# Patient Record
Sex: MALE | Race: ASIAN | NOT HISPANIC OR LATINO
[De-identification: names, ages, dates, MRNs, and addresses within clinical notes are randomized per-mention and may not be internally consistent; named-entity substitution may affect disease eponyms.]

---

## 2018-09-07 ENCOUNTER — APPOINTMENT (OUTPATIENT)
Dept: UROLOGY | Facility: CLINIC | Age: 73
End: 2018-09-07
Payer: MEDICARE

## 2018-09-07 VITALS
RESPIRATION RATE: 17 BRPM | TEMPERATURE: 98.4 F | WEIGHT: 153 LBS | HEIGHT: 64 IN | BODY MASS INDEX: 26.12 KG/M2 | OXYGEN SATURATION: 100 % | SYSTOLIC BLOOD PRESSURE: 151 MMHG | DIASTOLIC BLOOD PRESSURE: 88 MMHG | HEART RATE: 66 BPM

## 2018-09-07 PROCEDURE — 99204 OFFICE O/P NEW MOD 45 MIN: CPT

## 2018-09-07 RX ORDER — FINASTERIDE 5 MG/1
5 TABLET, FILM COATED ORAL
Refills: 0 | Status: ACTIVE | COMMUNITY

## 2018-09-07 RX ORDER — TAMSULOSIN HYDROCHLORIDE 0.4 MG/1
0.4 CAPSULE ORAL
Refills: 0 | Status: ACTIVE | COMMUNITY

## 2018-09-08 LAB
ANION GAP SERPL CALC-SCNC: 13 MMOL/L
APPEARANCE: CLEAR
BACTERIA: NEGATIVE
BILIRUBIN URINE: NEGATIVE
BLOOD URINE: NEGATIVE
BUN SERPL-MCNC: 13 MG/DL
CALCIUM SERPL-MCNC: 8.9 MG/DL
CHLORIDE SERPL-SCNC: 103 MMOL/L
CO2 SERPL-SCNC: 24 MMOL/L
COLOR: YELLOW
CREAT SERPL-MCNC: 1.1 MG/DL
GLUCOSE QUALITATIVE U: NEGATIVE MG/DL
GLUCOSE SERPL-MCNC: 93 MG/DL
KETONES URINE: NEGATIVE
LEUKOCYTE ESTERASE URINE: NEGATIVE
MICROSCOPIC-UA: NORMAL
NITRITE URINE: NEGATIVE
PH URINE: 6.5
POTASSIUM SERPL-SCNC: 4.5 MMOL/L
PROTEIN URINE: NEGATIVE MG/DL
PSA FREE FLD-MCNC: 24.7
PSA FREE SERPL-MCNC: 0.91 NG/ML
PSA SERPL-MCNC: 3.68 NG/ML
RED BLOOD CELLS URINE: 0 /HPF
SODIUM SERPL-SCNC: 140 MMOL/L
SPECIFIC GRAVITY URINE: 1.01
SQUAMOUS EPITHELIAL CELLS: 0 /HPF
UROBILINOGEN URINE: NEGATIVE MG/DL
WHITE BLOOD CELLS URINE: 0 /HPF

## 2018-12-04 ENCOUNTER — APPOINTMENT (OUTPATIENT)
Dept: UROLOGY | Facility: CLINIC | Age: 73
End: 2018-12-04
Payer: MEDICARE

## 2018-12-04 VITALS
RESPIRATION RATE: 17 BRPM | TEMPERATURE: 98.2 F | HEART RATE: 76 BPM | OXYGEN SATURATION: 97 % | SYSTOLIC BLOOD PRESSURE: 130 MMHG | BODY MASS INDEX: 27.46 KG/M2 | DIASTOLIC BLOOD PRESSURE: 80 MMHG | WEIGHT: 160 LBS

## 2018-12-04 PROCEDURE — 99214 OFFICE O/P EST MOD 30 MIN: CPT

## 2019-12-03 ENCOUNTER — APPOINTMENT (OUTPATIENT)
Dept: UROLOGY | Facility: CLINIC | Age: 74
End: 2019-12-03

## 2019-12-06 ENCOUNTER — APPOINTMENT (OUTPATIENT)
Dept: UROLOGY | Facility: CLINIC | Age: 74
End: 2019-12-06
Payer: MEDICARE

## 2019-12-06 VITALS
BODY MASS INDEX: 27.12 KG/M2 | HEART RATE: 77 BPM | WEIGHT: 158 LBS | TEMPERATURE: 98.1 F | DIASTOLIC BLOOD PRESSURE: 80 MMHG | SYSTOLIC BLOOD PRESSURE: 134 MMHG | RESPIRATION RATE: 17 BRPM | OXYGEN SATURATION: 97 %

## 2019-12-06 PROCEDURE — 99214 OFFICE O/P EST MOD 30 MIN: CPT | Mod: 25

## 2019-12-06 PROCEDURE — 51798 US URINE CAPACITY MEASURE: CPT

## 2019-12-06 NOTE — LETTER BODY
[FreeTextEntry1] : Rojelio Kenny MD\par [address]\[place] [address]\par Phone: [phone]\par Fax: [phone]\par \par Dear Dr. Kenny,\par \par REASON FOR VISIT: Elevated PSA. BPH\par \par This is a 74 year-old gentleman with history of elevated PSA, presenting with lower urinary tract symptoms and BPH. Patient returns for follow-up. Since his last visit, he is currently taking Flomax BID without side effects or difficulties. Patient reports some improvement but continues to complain of nocturia 2-4x a night. He denies any hematuria or urinary incontinence. His symptoms are aggravated by hydration. He denies any alleviating factors.  He denies any pain. All other review of systems are negative. He has no cancer in his family medical history. He has history of partial colectomy for colon cancer. Past medical history, family history and social history were unchanged. Medications and allergies were reviewed. He has no known allergies to medication. \par \par On examination, the patient is an older-appearing gentleman in no acute distress. He is alert and oriented follows commands. He has normal mood and affect. He is normocephalic. Neck is supple. Oral no thrush. Respirations are unlabored. His abdomen is soft and nontender. Bladder is nonpalpable. No CVA tenderness. Neurologically he is grossly intact. No peripheral edema. Skin without gross abnormality. He has normal male external genitalia. Normal meatus. Bilateral testes are descended intrascrotally and normal to palpation. On rectal examination, there is normal sphincter tone. The prostate is clinically benign without focal induration or nodularity.\par \par His BMP demonstrated normal renal functions, creatinine 1.1. His PSA was 3.68, which is within normal limits. \par \par Post-void residual on bladder scan today was 130 cc. \par \par ASSESSMENT: Elevated PSA. BPH\par \par I counseled the patient today. In terms of his BPH, I renewed his prescription for Flomax BID and Proscar today. I advised the patient to restrict PM fluid intake to avoid aggravating his symptoms. The patient will follow up in 3 months. Patient will obtain BMP and PSA testing to ensure stability. Risks and alternatives were discussed. I answered the patient questions. The patient will follow-up as directed and will contact me with any questions or concerns. Thank you for the opportunity to participate in the care of Mr. BUNDY. I will keep you updated on his progress.\par \par Plan: Continue Proscar and Flomax to BID. BMP. PSA. Follow up in 6 months.

## 2019-12-06 NOTE — ADDENDUM
[FreeTextEntry1] : Entered by Bonilla Bustillo, acting as scribe for Dr. Jarret Park.\par \par The documentation recorded by the scribe accurately reflects the service I personally performed and the decisions made by me.

## 2019-12-11 LAB
ANION GAP SERPL CALC-SCNC: 14 MMOL/L
BUN SERPL-MCNC: 14 MG/DL
CALCIUM SERPL-MCNC: 9.7 MG/DL
CHLORIDE SERPL-SCNC: 95 MMOL/L
CO2 SERPL-SCNC: 25 MMOL/L
CREAT SERPL-MCNC: 1.15 MG/DL
GLUCOSE SERPL-MCNC: 102 MG/DL
POTASSIUM SERPL-SCNC: 5.3 MMOL/L
PSA FREE FLD-MCNC: 24 %
PSA FREE SERPL-MCNC: 1.06 NG/ML
PSA SERPL-MCNC: 4.45 NG/ML
SODIUM SERPL-SCNC: 134 MMOL/L

## 2020-06-05 ENCOUNTER — APPOINTMENT (OUTPATIENT)
Dept: UROLOGY | Facility: CLINIC | Age: 75
End: 2020-06-05
Payer: MEDICARE

## 2020-06-05 VITALS
DIASTOLIC BLOOD PRESSURE: 84 MMHG | OXYGEN SATURATION: 97 % | TEMPERATURE: 97.5 F | HEART RATE: 80 BPM | RESPIRATION RATE: 17 BRPM | SYSTOLIC BLOOD PRESSURE: 146 MMHG | WEIGHT: 159 LBS | BODY MASS INDEX: 27.29 KG/M2

## 2020-06-05 LAB
APPEARANCE: CLEAR
BACTERIA: NEGATIVE
BILIRUBIN URINE: NEGATIVE
BLOOD URINE: NEGATIVE
COLOR: COLORLESS
GLUCOSE QUALITATIVE U: NEGATIVE
HYALINE CASTS: 0 /LPF
KETONES URINE: NEGATIVE
LEUKOCYTE ESTERASE URINE: NEGATIVE
MICROSCOPIC-UA: NORMAL
NITRITE URINE: NEGATIVE
PH URINE: 6.5
PROTEIN URINE: NEGATIVE
RED BLOOD CELLS URINE: 0 /HPF
SPECIFIC GRAVITY URINE: 1.01
SQUAMOUS EPITHELIAL CELLS: 0 /HPF
UROBILINOGEN URINE: NORMAL
WHITE BLOOD CELLS URINE: 0 /HPF

## 2020-06-05 PROCEDURE — 99214 OFFICE O/P EST MOD 30 MIN: CPT

## 2020-06-05 NOTE — HISTORY OF PRESENT ILLNESS
[FreeTextEntry1] : FUA BPH\par FLOMAX/PRSOCAR\par PVR 25 CC\par NEG ANIA\par \par FUA 1 YEAR\par \par Please refer to URO Consult note

## 2020-06-05 NOTE — ADDENDUM
[FreeTextEntry1] : Entered by Jacob Regalado, acting as scribe for Dr. Jarret Park.\par \par The documentation recorded by the scribe accurately reflects the service I personally performed and the decisions made by me.

## 2020-06-05 NOTE — LETTER BODY
[FreeTextEntry1] : Rojelio Kenny MD\par [address]\[place] [address]\par Phone: [phone]\par Fax: [phone]\par \par Dear Dr. Kenny,\par \par REASON FOR VISIT: Elevated PSA. BPH\par \par This is a 74 year-old gentleman with history of elevated PSA, presenting with BPH. Patient returns for follow up. Since his last visit, the patient reports taking Flomax BID and Proscar without any side effects or difficulties. Patient reports of an improvement in his urinary symptoms with medication. He denies any hematuria, dysuria, or urinary incontinence. He denies any changes in health. He has no pain. The past medical history, family history and social history are unchanged. All other review of systems are negative. Patient denies any changes in medications. Medication list was reconciled. \par \par On examination, the patient is an older-appearing gentleman in no acute distress. He is alert and oriented follows commands. He has normal mood and affect. He is normocephalic. Neck is supple. Oral no thrush. Respirations are unlabored. His abdomen is soft and nontender. Bladder is nonpalpable. No CVA tenderness. Neurologically he is grossly intact. No peripheral edema. Skin without gross abnormality. He has normal male external genitalia. Normal meatus. Bilateral testes are descended intrascrotally and normal to palpation. On rectal examination, there is normal sphincter tone. The prostate is clinically benign without focal induration or nodularity.\par \par His BMP in December 2019 demonstrated normal renal functions, creatinine 1.15. His PSA was 4.45, which is elevated. \par \par Post-void residual on bladder scan today was 25 cc. \par \par ASSESSMENT: Elevated PSA. BPH, symptoms improved with Flomax BID and Proscar.\par \par I counseled the patient today. He is doing well. In terms of his BPH, his symptoms are improved with medical therapy. I renewed the patient's prescription for Flomax BID and Proscar today. I encouraged the patient to continue medications regularly as directed. He will obtain urinalysis today to ensure stability. Risks and alternatives were discussed. I answered the patient questions. The patient will follow-up as directed and will contact me with any questions or concerns. Thank you for the opportunity to participate in the care of Mr. BUNDY. I will keep you updated on his progress.\par \par Plan: Continue Flomax BID and Proscar. Urinalysis. Follow up in 1 year.\par \par I spent over half of the 25-minute encounter counseling the patient and coordinating his care.

## 2021-06-04 ENCOUNTER — APPOINTMENT (OUTPATIENT)
Dept: UROLOGY | Facility: CLINIC | Age: 76
End: 2021-06-04
Payer: MEDICARE

## 2021-06-04 VITALS
SYSTOLIC BLOOD PRESSURE: 123 MMHG | DIASTOLIC BLOOD PRESSURE: 82 MMHG | OXYGEN SATURATION: 97 % | TEMPERATURE: 96.6 F | BODY MASS INDEX: 26.61 KG/M2 | HEART RATE: 97 BPM | RESPIRATION RATE: 18 BRPM | WEIGHT: 155 LBS

## 2021-06-04 LAB
ANION GAP SERPL CALC-SCNC: 10 MMOL/L
BUN SERPL-MCNC: 13 MG/DL
CALCIUM SERPL-MCNC: 9.1 MG/DL
CHLORIDE SERPL-SCNC: 102 MMOL/L
CO2 SERPL-SCNC: 26 MMOL/L
CREAT SERPL-MCNC: 1.33 MG/DL
GLUCOSE SERPL-MCNC: 93 MG/DL
POTASSIUM SERPL-SCNC: 4.4 MMOL/L
PSA FREE FLD-MCNC: 22 %
PSA FREE SERPL-MCNC: 1.03 NG/ML
PSA SERPL-MCNC: 4.66 NG/ML
SODIUM SERPL-SCNC: 138 MMOL/L

## 2021-06-04 PROCEDURE — 99214 OFFICE O/P EST MOD 30 MIN: CPT

## 2021-06-04 PROCEDURE — 99072 ADDL SUPL MATRL&STAF TM PHE: CPT

## 2021-06-06 NOTE — LETTER BODY
[FreeTextEntry1] : \par Rojelio Kenny MD\par [address]\[place] [address]\par Phone: [phone]\par Fax: [phone]\par \par Dear Dr. Kenny,\par \par REASON FOR VISIT: Elevated PSA. BPH\par \par This is a 74 year-old gentleman with history of elevated PSA, presenting with BPH. Patient returns for follow up. His BMP in December 2019 demonstrated normal renal functions, creatinine 1.15. His PSA was 4.45, which is elevated. Since his last visit, the patient reports taking Flomax BID and Proscar without any side effects or difficulties. Patient reports of an improvement in his urinary symptoms with medication. He denies any hematuria, dysuria, or urinary incontinence. He denies any changes in health. He has no pain. The past medical history, family history and social history are unchanged. All other review of systems are negative. Patient denies any changes in medications. Medication list was reconciled. \par \par On examination, the patient is an older-appearing gentleman in no acute distress. He is alert and oriented follows commands. He has normal mood and affect. He is normocephalic. Neck is supple. Oral no thrush. Respirations are unlabored. His abdomen is soft and nontender. Bladder is nonpalpable. No CVA tenderness. Neurologically he is grossly intact. No peripheral edema. Skin without gross abnormality. He has normal male external genitalia. Normal meatus. Bilateral testes are descended intrascrotally and normal to palpation. On rectal examination, there is normal sphincter tone. The prostate is clinically benign without focal induration or nodularity.\par \par His previous BMP in December 2019 demonstrated normal renal functions, creatinine 1.15. His PSA was 4.45, which is elevated. \par \par ASSESSMENT: Elevated PSA. BPH, symptoms improved with Flomax BID and Proscar.\par \par I counseled the patient today. He is doing well. In terms of his BPH, his symptoms are improved with medical therapy. I renewed the patient's prescription for Flomax BID and Proscar today. I encouraged the patient to continue medications regularly as directed. He will obtain urinalysis today to ensure stability.  In terms of his PSA, patient will repeat his PSA to ensure stability.  Risks and alternatives were discussed. I answered the patient questions. The patient will follow-up as directed and will contact me with any questions or concerns. Thank you for the opportunity to participate in the care of Mr. BUNDY. I will keep you updated on his progress.\par \par Plan: Continue Flomax BID and Proscar. BMP. PSA. Urinalysis. Follow up in 1 year.

## 2021-06-06 NOTE — ADDENDUM
[FreeTextEntry1] : Entered by Noemi Curiel, acting as scribe for Dr. Jarret Park.\par \par The documentation recorded by the scribe accurately reflects the service I personally performed and the decisions made by me.

## 2022-06-10 ENCOUNTER — APPOINTMENT (OUTPATIENT)
Dept: UROLOGY | Facility: CLINIC | Age: 77
End: 2022-06-10

## 2022-10-21 ENCOUNTER — APPOINTMENT (OUTPATIENT)
Dept: UROLOGY | Facility: CLINIC | Age: 77
End: 2022-10-21

## 2022-10-21 VITALS
DIASTOLIC BLOOD PRESSURE: 80 MMHG | HEART RATE: 93 BPM | OXYGEN SATURATION: 99 % | TEMPERATURE: 97 F | RESPIRATION RATE: 18 BRPM | WEIGHT: 160 LBS | SYSTOLIC BLOOD PRESSURE: 120 MMHG | BODY MASS INDEX: 27.46 KG/M2

## 2022-10-21 DIAGNOSIS — G25.0 ESSENTIAL TREMOR: ICD-10-CM

## 2022-10-21 DIAGNOSIS — Z00.00 ENCOUNTER FOR GENERAL ADULT MEDICAL EXAMINATION W/OUT ABNORMAL FINDINGS: ICD-10-CM

## 2022-10-21 PROCEDURE — 99214 OFFICE O/P EST MOD 30 MIN: CPT

## 2022-10-21 NOTE — LETTER BODY
[FreeTextEntry1] : Rojelio Kenny MD\par [address]\[place] [address]\par Phone: [phone]\par Fax: [phone]\par \par Dear Dr. Kenny,\par \par REASON FOR VISIT: Elevated PSA. BPH\par \par This is a 76 year-old gentleman with history of elevated PSA, presenting with BPH. Patient returns for follow up. Since his last visit, the patient reports taking Flomax BID and Proscar without any side effects or difficulties. Patient reports of improvement in his urinary symptoms with medication. He denies any hematuria, dysuria, or urinary incontinence. He denies any changes in health. He has no pain. The past medical history, family history and social history are unchanged. All other review of systems are negative. Patient denies any changes in medications. Medication list was reconciled. \par \par On examination, the patient is an older-appearing gentleman in no acute distress. He is alert and oriented follows commands. He has normal mood and affect. He is normocephalic. Neck is supple. Oral no thrush. Respirations are unlabored. His abdomen is soft and nontender. Bladder is nonpalpable. No CVA tenderness. Neurologically he is grossly intact. No peripheral edema. Skin without gross abnormality. He has normal male external genitalia. Normal meatus. Bilateral testes are descended intrascrotally and normal to palpation. On rectal examination, there is normal sphincter tone. The prostate is clinically benign without focal induration or nodularity.\par \par His BMP in June 2021 demonstrated decreased renal functions, creatinine 1.33. His PSA was 4.66, which is elevated. \par \par ASSESSMENT: Elevated PSA. BPH, symptoms improved with Flomax BID and Proscar.\par \par I counseled the patient today. He is doing well. In terms of his BPH, his symptoms are improved with medical therapy. I renewed the patient's prescription for Flomax BID and Proscar today. I encouraged the patient to continue medications regularly as directed. He will obtain PSA and BMP today to ensure stability. Risks and alternatives were discussed. I answered the patient questions. The patient will follow-up as directed and will contact me with any questions or concerns. Thank you for the opportunity to participate in the care of Mr. DU. I will keep you updated on his progress.\par \par Plan: Continue Flomax BID and Proscar. BMP. PSA. Follow up in 1 year.\par \par His BMP today demonstrated decreased renal function, creatinine 1.37. His PSA today was 4.11, which is elevated but improved.

## 2022-10-21 NOTE — ADDENDUM
[FreeTextEntry1] : Entered by SANTOS MANNING, acting as scribe for Dr. Jarret Park.\par The documentation recorded by the scribe accurately reflects the service I personally performed and the decisions made by me.

## 2022-10-22 LAB
ANION GAP SERPL CALC-SCNC: 17 MMOL/L
BUN SERPL-MCNC: 17 MG/DL
CALCIUM SERPL-MCNC: 9 MG/DL
CHLORIDE SERPL-SCNC: 99 MMOL/L
CO2 SERPL-SCNC: 21 MMOL/L
CREAT SERPL-MCNC: 1.37 MG/DL
EGFR: 53 ML/MIN/1.73M2
GLUCOSE SERPL-MCNC: 152 MG/DL
POTASSIUM SERPL-SCNC: 4.5 MMOL/L
PSA FREE FLD-MCNC: 30 %
PSA FREE SERPL-MCNC: 1.23 NG/ML
PSA SERPL-MCNC: 4.11 NG/ML
SODIUM SERPL-SCNC: 137 MMOL/L

## 2023-10-20 ENCOUNTER — APPOINTMENT (OUTPATIENT)
Dept: UROLOGY | Facility: CLINIC | Age: 78
End: 2023-10-20

## 2024-03-15 ENCOUNTER — APPOINTMENT (OUTPATIENT)
Dept: UROLOGY | Facility: CLINIC | Age: 79
End: 2024-03-15
Payer: MEDICARE

## 2024-03-15 VITALS
BODY MASS INDEX: 28.32 KG/M2 | WEIGHT: 165 LBS | DIASTOLIC BLOOD PRESSURE: 86 MMHG | OXYGEN SATURATION: 96 % | SYSTOLIC BLOOD PRESSURE: 133 MMHG | TEMPERATURE: 96.8 F | HEART RATE: 88 BPM | RESPIRATION RATE: 16 BRPM

## 2024-03-15 DIAGNOSIS — N40.1 BENIGN PROSTATIC HYPERPLASIA WITH LOWER URINARY TRACT SYMPMS: ICD-10-CM

## 2024-03-15 DIAGNOSIS — N13.8 BENIGN PROSTATIC HYPERPLASIA WITH LOWER URINARY TRACT SYMPMS: ICD-10-CM

## 2024-03-15 DIAGNOSIS — R97.20 ELEVATED PROSTATE, SPECIFIC ANTIGEN [PSA]: ICD-10-CM

## 2024-03-15 PROCEDURE — 99214 OFFICE O/P EST MOD 30 MIN: CPT

## 2024-03-15 PROCEDURE — G2211 COMPLEX E/M VISIT ADD ON: CPT

## 2024-03-16 LAB
ANION GAP SERPL CALC-SCNC: 15 MMOL/L
APPEARANCE: CLEAR
BACTERIA: NEGATIVE /HPF
BILIRUBIN URINE: NEGATIVE
BLOOD URINE: NEGATIVE
BUN SERPL-MCNC: 17 MG/DL
CALCIUM SERPL-MCNC: 9.6 MG/DL
CAST: 0 /LPF
CHLORIDE SERPL-SCNC: 102 MMOL/L
CO2 SERPL-SCNC: 24 MMOL/L
COLOR: YELLOW
CREAT SERPL-MCNC: 1.38 MG/DL
EGFR: 52 ML/MIN/1.73M2
EPITHELIAL CELLS: 0 /HPF
GLUCOSE QUALITATIVE U: NEGATIVE MG/DL
GLUCOSE SERPL-MCNC: 125 MG/DL
KETONES URINE: NEGATIVE MG/DL
LEUKOCYTE ESTERASE URINE: NEGATIVE
MICROSCOPIC-UA: NORMAL
NITRITE URINE: NEGATIVE
PH URINE: 7
POTASSIUM SERPL-SCNC: 4.9 MMOL/L
PROTEIN URINE: NEGATIVE MG/DL
PSA FREE FLD-MCNC: 28 %
PSA FREE SERPL-MCNC: 1.05 NG/ML
PSA SERPL-MCNC: 3.74 NG/ML
RED BLOOD CELLS URINE: 0 /HPF
SODIUM SERPL-SCNC: 140 MMOL/L
SPECIFIC GRAVITY URINE: 1.01
UROBILINOGEN URINE: 0.2 MG/DL
WHITE BLOOD CELLS URINE: 0 /HPF

## 2024-03-16 NOTE — ADDENDUM
[FreeTextEntry1] : Entered by Musa Jorge, acting as scribe for Dr. Jarret Park. The documentation recorded by the scribe accurately reflects the service I personally performed and the decisions made by me.

## 2024-03-16 NOTE — LETTER BODY
[FreeTextEntry1] : Rojelio Kenny MD  [address] [place] [id number]  Phone: [phone]  Fax: [phone]    Dear Dr. Kenny,    REASON FOR VISIT: Elevated PSA. BPH    This is a 78 year-old gentleman with history of elevated PSA, presenting with BPH. Patient returns for follow up. Since his last visit, the patient reports taking Flomax BID and Proscar without any side effects or difficulties. Patient reports of stable urinary symptoms with medication. He denies any hematuria, dysuria, or urinary incontinence. He denies any changes in health. He has no pain. The past medical history, family history and social history are unchanged. All other review of systems are negative. Patient denies any changes in medications. Medication list was reconciled.    On examination, the patient is an older-appearing gentleman in no acute distress. He is alert and oriented follows commands. He has normal mood and affect. He is normocephalic. Neck is supple. Oral no thrush. Respirations are unlabored. His abdomen is soft and nontender. Bladder is nonpalpable. No CVA tenderness. Neurologically he is grossly intact. No peripheral edema. Skin without gross abnormality. He has normal male external genitalia. Normal meatus. Bilateral testes are descended intrascrotally and normal to palpation. On rectal examination, there is normal sphincter tone. The prostate is clinically benign without focal induration or nodularity.    His BMP in 2022 demonstrated decreased renal function, creatinine 1.37. His PSA was 4.11, which is elevated but improved.    ASSESSMENT: Elevated PSA. BPH, symptoms improved with Flomax BID and Proscar.    I counseled the patient today. He is doing well. In terms of his BPH, his symptoms are stable with medical therapy. I renewed the patient's prescription for Flomax BID and Proscar today. I encouraged the patient to continue medications regularly as directed. He will obtain PSA and BMP today to ensure stability. Risks and alternatives were discussed. I answered the patient questions. The patient will follow-up as directed and will contact me with any questions or concerns. Thank you for the opportunity to participate in the care of Mr. BUNDY. I will keep you updated on his progress.  Patient will continue longitudinal care for his complex and serious chronic condition.   Plan: Continue Flomax BID and Proscar. BMP. PSA. Follow up in 1 year.  I spent 30-minutes time today on all issues related to this patient on today date of service including non face to face time.

## 2024-03-18 LAB — URINE CYTOLOGY: NORMAL

## 2025-03-14 ENCOUNTER — APPOINTMENT (OUTPATIENT)
Dept: UROLOGY | Facility: CLINIC | Age: 80
End: 2025-03-14
Payer: MEDICARE

## 2025-03-14 VITALS
DIASTOLIC BLOOD PRESSURE: 83 MMHG | SYSTOLIC BLOOD PRESSURE: 133 MMHG | BODY MASS INDEX: 28.49 KG/M2 | RESPIRATION RATE: 16 BRPM | HEART RATE: 89 BPM | TEMPERATURE: 97.2 F | OXYGEN SATURATION: 99 % | WEIGHT: 166 LBS

## 2025-03-14 DIAGNOSIS — N40.1 BENIGN PROSTATIC HYPERPLASIA WITH LOWER URINARY TRACT SYMPMS: ICD-10-CM

## 2025-03-14 DIAGNOSIS — R97.20 ELEVATED PROSTATE, SPECIFIC ANTIGEN [PSA]: ICD-10-CM

## 2025-03-14 DIAGNOSIS — N13.8 BENIGN PROSTATIC HYPERPLASIA WITH LOWER URINARY TRACT SYMPMS: ICD-10-CM

## 2025-03-14 PROCEDURE — 51798 US URINE CAPACITY MEASURE: CPT

## 2025-03-14 PROCEDURE — 99214 OFFICE O/P EST MOD 30 MIN: CPT

## 2025-03-14 PROCEDURE — G2211 COMPLEX E/M VISIT ADD ON: CPT

## 2025-03-16 LAB
ANION GAP SERPL CALC-SCNC: 15 MMOL/L
APPEARANCE: CLEAR
BACTERIA UR CULT: NORMAL
BACTERIA: NEGATIVE /HPF
BILIRUBIN URINE: NEGATIVE
BLOOD URINE: NEGATIVE
BUN SERPL-MCNC: 15 MG/DL
CALCIUM SERPL-MCNC: 9 MG/DL
CAST: 0 /LPF
CHLORIDE SERPL-SCNC: 100 MMOL/L
CO2 SERPL-SCNC: 23 MMOL/L
COLOR: YELLOW
CREAT SERPL-MCNC: 1.32 MG/DL
EGFRCR SERPLBLD CKD-EPI 2021: 55 ML/MIN/1.73M2
EPITHELIAL CELLS: 0 /HPF
GLUCOSE QUALITATIVE U: NEGATIVE MG/DL
GLUCOSE SERPL-MCNC: 85 MG/DL
KETONES URINE: NEGATIVE MG/DL
LEUKOCYTE ESTERASE URINE: NEGATIVE
MICROSCOPIC-UA: NORMAL
NITRITE URINE: NEGATIVE
PH URINE: 7
POTASSIUM SERPL-SCNC: 4.3 MMOL/L
PROTEIN URINE: NEGATIVE MG/DL
PSA FREE FLD-MCNC: 26 %
PSA FREE SERPL-MCNC: 0.93 NG/ML
PSA SERPL-MCNC: 3.61 NG/ML
RED BLOOD CELLS URINE: 0 /HPF
SODIUM SERPL-SCNC: 139 MMOL/L
SPECIFIC GRAVITY URINE: 1.01
UROBILINOGEN URINE: 0.2 MG/DL
WHITE BLOOD CELLS URINE: 0 /HPF